# Patient Record
Sex: FEMALE | Race: BLACK OR AFRICAN AMERICAN | Employment: UNEMPLOYED | URBAN - METROPOLITAN AREA
[De-identification: names, ages, dates, MRNs, and addresses within clinical notes are randomized per-mention and may not be internally consistent; named-entity substitution may affect disease eponyms.]

---

## 2019-04-07 ENCOUNTER — ED HISTORICAL/CONVERTED ENCOUNTER (OUTPATIENT)
Dept: OTHER | Age: 34
End: 2019-04-07

## 2019-04-10 ENCOUNTER — ED HISTORICAL/CONVERTED ENCOUNTER (OUTPATIENT)
Dept: OTHER | Age: 34
End: 2019-04-10

## 2019-04-16 ENCOUNTER — ED HISTORICAL/CONVERTED ENCOUNTER (OUTPATIENT)
Dept: OTHER | Age: 34
End: 2019-04-16

## 2019-04-17 ENCOUNTER — OP HISTORICAL/CONVERTED ENCOUNTER (OUTPATIENT)
Dept: OTHER | Age: 34
End: 2019-04-17

## 2019-07-29 ENCOUNTER — OP HISTORICAL/CONVERTED ENCOUNTER (OUTPATIENT)
Dept: OTHER | Age: 34
End: 2019-07-29

## 2020-08-19 ENCOUNTER — OFFICE VISIT (OUTPATIENT)
Dept: OBGYN CLINIC | Age: 35
End: 2020-08-19
Payer: MEDICAID

## 2020-08-19 VITALS — WEIGHT: 231 LBS | BODY MASS INDEX: 39.44 KG/M2 | HEIGHT: 64 IN

## 2020-08-19 DIAGNOSIS — E66.01 SEVERE OBESITY (HCC): ICD-10-CM

## 2020-08-19 DIAGNOSIS — Z01.411 ENCOUNTER FOR GYNECOLOGICAL EXAMINATION WITH ABNORMAL FINDING: Primary | ICD-10-CM

## 2020-08-19 DIAGNOSIS — Z11.3 SCREENING FOR VENEREAL DISEASE: ICD-10-CM

## 2020-08-19 DIAGNOSIS — N97.0 OLIGO-OVULATION: ICD-10-CM

## 2020-08-19 DIAGNOSIS — Z12.4 SCREENING FOR MALIGNANT NEOPLASM OF THE CERVIX: ICD-10-CM

## 2020-08-19 PROCEDURE — S0612 ANNUAL GYNECOLOGICAL EXAMINA: HCPCS | Performed by: OBSTETRICS & GYNECOLOGY

## 2020-08-19 RX ORDER — LETROZOLE 2.5 MG/1
TABLET, FILM COATED ORAL
COMMUNITY
Start: 2020-06-10

## 2020-08-19 NOTE — PROGRESS NOTES
patient is a 27year-old black female  1 para 65, last menstrual period 2020, history of laparoscopic right salpingectomy for ruptured ectopic pregnancy, 2019, history of an abnormal Pap smear , 10 year history of oral contraceptive use complicated by headaches, and weight loss, 1 year history of patch use complicated by menorrhagia, who presents for annual gynecologic exam.    Patient has a history of a right-sided ectopic pregnancy requiring salpingectomy, underwent subsequent hysterosalpingogram that demonstrated a patent left tube, and since then has undergone ovulation induction for 2 cycles-in May and  of this year. She states that she is without gynecologic complaints. She denies vaginal discharge itching odor pelvic pain or vaginal bleeding. She is sexually active with the same partner since . Last sexual encounter was 4 days ago and she denies pain or bleeding with intercourse. She works as a .  She is a nonsmoker    Patient does not currently perform monthly self breast examination-she was encouraged to do so  Patient does not currently consume vitamin D regularly-she was encouraged to do so (she does drink a lot of OJ however)  Patient does not currently exercise regularly-she was encouraged to do so    No past medical history on file. No past surgical history on file.    Social History     Socioeconomic History    Marital status: SINGLE     Spouse name: Not on file    Number of children: Not on file    Years of education: Not on file    Highest education level: Not on file   Tobacco Use    Smoking status: Never Smoker    Smokeless tobacco: Never Used   Substance and Sexual Activity    Alcohol use: Never     Frequency: Never    Drug use: Never    Sexual activity: Yes      Family History   Problem Relation Age of Onset    No Known Problems Mother     No Known Problems Father       No current outpatient medications on file prior to visit. No current facility-administered medications on file prior to visit.       Allergies as of 08/19/2020 - Review Complete 08/18/2020   Allergen Reaction Noted    Penicillins Unknown (comments) 08/18/2020       Constitutional  General Appearance: healthy-appearing, well-nourished, well-developed (black female)  Level of Distress: NAD  Ambulation: ambulating normally    Psychiatric  Insight: good judgement  Mental Status: active and alert, normal mood, normal affect  Orientation: to time, to place, to person  Memory: recent memory normal, remote memory normal    Head  Head: normocephalic, atraumatic    Lungs  Respiratory effort: no dyspnea  Auscultation: breath sounds normal, good air movement, CTA except as noted, no wheezing, no rales/crackles, no rhonchi    Cardiovascular  Heart Auscultation: RRR, normal S1, normal S2, no murmurs, no rubs, no gallops  Pulses including femoral / pedal: normal throughout    Breasts without masses, nipple discharge, axillary adenopathy    Abdomen  Bowel Sounds: normal  Inspection and Palpation: soft, non-distended, no tenderness, no guarding, no rebound tenderness, no masses, no CVA tenderness (4 incisions are well-healed  Liver: non-tender, no hepatomegaly  Spleen: non-tender, no splenomegaly  Hernia: none palpable    Pelvic exam:  External genitalia within normal limits  Vagina: Bloody discharge  Cervix: Nulliparous, without cervical motion tenderness, mucoid discharge  Uterus normal size without tenderness, midline  Adnexa without masses, nontender  Bladder, urethra within normal limits    Musculoskeletal:  Motor Strength and Tone: normal motor strength, normal tone  Joints, Bones, and Muscles: normal movement of all extremities, no bony abnormalities, no contractures, no malalignment, no tenderness  Extremities: no cyanosis, no edema, no varicosities, no palpable cord    Neurologic  Gait and Station: normal gait, normal station  Sensation: grossly intact  Reflexes: DTRs 2+ bilaterally throughout    Skin  Inspection and palpation: Multiple tattoos; no rash, no lesions, no ulcer, no abnormal nevi, no induration, no nodules, good turgor, no jaundice  Nails: normal      ICD-10-CM ICD-9-CM    1. Encounter for gynecological examination with abnormal finding  Z01.411 V72.31    2. Severe obesity ([address]  E66.01 278.01    3. Screening for malignant neoplasm of the cervix  Z12.4 V76.2 PAP IG, CT-NG TV RFX HPV ZARW([id number])   4. Screening for venereal disease  Z11.3 V74.5    5.  Suppression of menses  N94.89 626.8      Normal exam  Check Pap smear, STD screen  Continue ovulation induction  Discussed reproductive endocrinology referral, but patient wants to attempt approximately 4-6 more cycles  Follow-up in 6 months  Encourage self breast examination, vitamin D consumption, exercise

## 2020-08-23 LAB
C TRACH RRNA CVX QL NAA+PROBE: NEGATIVE
CYTOLOGIST CVX/VAG CYTO: NORMAL
CYTOLOGY CVX/VAG DOC CYTO: NORMAL
CYTOLOGY CVX/VAG DOC THIN PREP: NORMAL
DX ICD CODE: NORMAL
LABCORP, 190119: NORMAL
Lab: NORMAL
N GONORRHOEA RRNA CVX QL NAA+PROBE: NEGATIVE
OTHER STN SPEC: NORMAL
STAT OF ADQ CVX/VAG CYTO-IMP: NORMAL
T VAGINALIS RRNA SPEC QL NAA+PROBE: NEGATIVE

## 2021-01-21 ENCOUNTER — TELEPHONE (OUTPATIENT)
Dept: OBGYN CLINIC | Age: 36
End: 2021-01-21

## 2021-01-21 NOTE — TELEPHONE ENCOUNTER
Patient states that on her last appt with Dr. Mateus Edmond they discussed diet pills and if she wanted them to call up here so he can write the script. She would like a call back.

## 2021-01-26 NOTE — TELEPHONE ENCOUNTER
Spoke with Dr Chencho Wyman and he had wanted her to follow up in 6 mos, appt scheduled, pt notified.

## 2021-02-15 ENCOUNTER — OFFICE VISIT (OUTPATIENT)
Dept: OBGYN CLINIC | Age: 36
End: 2021-02-15
Payer: MEDICAID

## 2021-02-15 VITALS
HEIGHT: 64 IN | SYSTOLIC BLOOD PRESSURE: 121 MMHG | HEART RATE: 83 BPM | WEIGHT: 238 LBS | TEMPERATURE: 98.1 F | BODY MASS INDEX: 40.63 KG/M2 | DIASTOLIC BLOOD PRESSURE: 92 MMHG

## 2021-02-15 DIAGNOSIS — Z87.59 HISTORY OF ECTOPIC PREGNANCY: ICD-10-CM

## 2021-02-15 DIAGNOSIS — E66.01 MORBID OBESITY (HCC): Primary | ICD-10-CM

## 2021-02-15 PROCEDURE — 99213 OFFICE O/P EST LOW 20 MIN: CPT | Performed by: OBSTETRICS & GYNECOLOGY

## 2021-02-15 NOTE — PROGRESS NOTES
patient is a 38 y.o. black female  1 para 0010,  last menstrual period 2021, history of laparoscopic right salpingectomy for ruptured ectopic pregnancy, 2019, history of an abnormal Pap smear , 10 year history of oral contraceptive use complicated by headaches, and weight loss, 1 year history of patch use complicated by menorrhagia, who presents for concerns regarding fertility. Patient has a history of a right-sided ectopic pregnancy requiring salpingectomy, underwent subsequent hysterosalpingogram that demonstrated a patent left tube, and since then has undergone ovulation induction letrozole since. She has several concerns, primarily of which is her weight. We discussed several strategies to weight loss including Atkins, as well as calorie restriction, and exercise. Patient voiced understanding. She also discussed her concerns about infertility, and voices some interest in intrauterine insemination. She does remark that she occasionally skips periods. She states that she is without gynecologic complaints. She denies vaginal discharge itching odor pelvic pain or vaginal bleeding. She is sexually active with the same partner since . Last sexual encounter was 10 days ago and she denies pain or bleeding with intercourse. She works as a .  She is a nonsmoker    Last Pap smear 2020-negative; STD screen was negative as well    History reviewed. No pertinent past medical history. History reviewed. No pertinent surgical history.    Social History     Socioeconomic History    Marital status: SINGLE     Spouse name: Not on file    Number of children: Not on file    Years of education: Not on file    Highest education level: Not on file   Tobacco Use    Smoking status: Never Smoker    Smokeless tobacco: Never Used   Substance and Sexual Activity    Alcohol use: Never     Frequency: Never    Drug use: Never    Sexual activity: Yes Family History   Problem Relation Age of Onset    No Known Problems Mother     No Known Problems Father       Current Outpatient Medications on File Prior to Visit   Medication Sig Dispense Refill    letrozole (FEMARA) 2.5 mg tablet TAKE 2 TABLETS BY MOUTH ONCE A DAY ON DAYS 3 7 OF MENSTRUAL CYCLE       No current facility-administered medications on file prior to visit.       Allergies as of 02/15/2021 - Review Complete 02/15/2021   Allergen Reaction Noted    Penicillins Unknown (comments) 08/18/2020       Constitutional  General Appearance: healthy-appearing, well-nourished, well-developed (black female)  Level of Distress: NAD  Ambulation: ambulating normally    Psychiatric  Insight: good judgement  Mental Status: active and alert, normal mood, normal affect  Orientation: to time, to place, to person  Memory: recent memory normal, remote memory normal    Head  Head: normocephalic, atraumatic    Lungs  Respiratory effort: no dyspnea  Auscultation: breath sounds normal, good air movement, CTA except as noted, no wheezing, no rales/crackles, no rhonchi    Cardiovascular  Heart Auscultation: RRR, normal S1, normal S2, no murmurs, no rubs, no gallops  Pulses including femoral / pedal: normal throughout    Abdomen  Bowel Sounds: normal  Inspection and Palpation: soft, non-distended, no tenderness, no guarding, no rebound tenderness, no masses, no CVA tenderness (4 incisions are well-healed  Liver: non-tender, no hepatomegaly  Spleen: non-tender, no splenomegaly  Hernia: none palpable    Musculoskeletal:  Motor Strength and Tone: normal motor strength, normal tone  Joints, Bones, and Muscles: normal movement of all extremities, no bony abnormalities, no contractures, no malalignment, no tenderness  Extremities: no cyanosis, no edema, no varicosities, no palpable cord    Neurologic  Gait and Station: normal gait, normal station  Sensation: grossly intact  Reflexes: DTRs 2+ bilaterally throughout    Skin  Inspection and palpation: Multiple tattoos; no rash, no lesions, no ulcer, no abnormal nevi, no induration, no nodules, good turgor, no jaundice  Nails: normal      ICD-10-CM ICD-9-CM    1. Morbid obesity ([address]  E66.01 278.01    2. History of ectopic pregnancy  Z87.59 V13.29      Dieting and exercise regimens discussed. Intrauterine insemination discussed, and patient given referral for reproductive endocrinology.   Needs annual exam August/September 2021

## 2022-03-19 PROBLEM — E66.01 SEVERE OBESITY (HCC): Status: ACTIVE | Noted: 2020-08-19

## 2022-07-19 ENCOUNTER — OFFICE VISIT (OUTPATIENT)
Dept: OBGYN CLINIC | Age: 37
End: 2022-07-19
Payer: MEDICAID

## 2022-07-19 VITALS
SYSTOLIC BLOOD PRESSURE: 135 MMHG | OXYGEN SATURATION: 99 % | RESPIRATION RATE: 16 BRPM | WEIGHT: 226 LBS | BODY MASS INDEX: 38.58 KG/M2 | DIASTOLIC BLOOD PRESSURE: 83 MMHG | HEIGHT: 64 IN | HEART RATE: 77 BPM

## 2022-07-19 DIAGNOSIS — Z12.4 ENCOUNTER FOR SCREENING FOR MALIGNANT NEOPLASM OF CERVIX: ICD-10-CM

## 2022-07-19 DIAGNOSIS — Z11.3 ROUTINE SCREENING FOR STI (SEXUALLY TRANSMITTED INFECTION): ICD-10-CM

## 2022-07-19 DIAGNOSIS — Z01.419 GYNECOLOGIC EXAM NORMAL: Primary | ICD-10-CM

## 2022-07-19 PROCEDURE — 99395 PREV VISIT EST AGE 18-39: CPT | Performed by: OBSTETRICS & GYNECOLOGY

## 2022-07-21 NOTE — PROGRESS NOTES
Ehsan Lam is a 39 y.o. female, , Patient's last menstrual period was 2022., who presents today for the following:  Chief Complaint   Patient presents with    Annual Exam        Allergies   Allergen Reactions    Penicillins Unknown (comments)       Current Outpatient Medications   Medication Sig    letrozole (FEMARA) 2.5 mg tablet TAKE 2 TABLETS BY MOUTH ONCE A DAY ON DAYS 3 7 OF MENSTRUAL CYCLE     No current facility-administered medications for this visit. History reviewed. No pertinent past medical history. History reviewed. No pertinent surgical history. Family History   Problem Relation Age of Onset    No Known Problems Mother     No Known Problems Father        Social History     Socioeconomic History    Marital status: SINGLE     Spouse name: Not on file    Number of children: Not on file    Years of education: Not on file    Highest education level: Not on file   Occupational History    Not on file   Tobacco Use    Smoking status: Never    Smokeless tobacco: Never   Substance and Sexual Activity    Alcohol use: Never    Drug use: Never    Sexual activity: Yes   Other Topics Concern    Not on file   Social History Narrative    Not on file     Social Determinants of Health     Financial Resource Strain: Not on file   Food Insecurity: Not on file   Transportation Needs: Not on file   Physical Activity: Not on file   Stress: Not on file   Social Connections: Not on file   Intimate Partner Violence: Not on file   Housing Stability: Not on file         Annual Exam  Annual exam  STI screen    Review of Systems   Constitutional: Negative. Respiratory: Negative. Cardiovascular: Negative. Gastrointestinal: Negative. Genitourinary: Negative. Musculoskeletal: Negative. Skin: Negative. Neurological: Negative. Endo/Heme/Allergies: Negative. Psychiatric/Behavioral: Negative. All other systems reviewed and are negative.        /83 (BP 1 Location: Right arm, BP Patient Position: Sitting)   Pulse 77   Resp 16   Ht 5' 4\" (1.626 m)   Wt 226 lb (102.5 kg)   LMP 07/17/2022   SpO2 99%   BMI 38.79 kg/m²    OBGyn Exam   Constitutional:     General Appearance: healthy-appearing, well-nourished, and well-developed; Level of Distress: NAD. Ambulation: ambulating normally. Psychiatric:   Insight: good judgement. Mental Status: normal mood and affect and active and alert. Orientation: to time, place, and person. Memory: recent memory normal and remote memory normal.     Head: Head: normocephalic and atraumatic. Neck:   Neck: supple, FROM, trachea midline, and no masses. Lymph Nodes: no cervical LAD, supraclavicular LAD, axillary LAD, or inguinal LAD. Thyroid: no enlargement or nodules and non-tender. Lungs:   Respiratory effort: no dyspnea. Cardiovascular: .   Pulses including femoral / pedal: normal throughout. Breast: Breast: no masses or abnormal secretions and normal appearance. Abdomen:    no tenderness, guarding, masses, rebound tenderness, or CVA tenderness and non-distended. Female :   External genitalia: no lesions or rash and normal.   Vagina: moist mucosa;   Cervix: no discharge, inflammation, or cervical motion tenderness   Uterus: midline, smooth, and non-tender; normal size   Adnexae: no adnexal mass or tenderness and size WNL. Bladder and Urethra: normal bladder and urethra (except where noted). 1. Gynecologic exam normal    - PAP IG, CT-NG-TV, RFX APTIMA HPV ASCUS ([id number])    2. Encounter for screening for malignant neoplasm of cervix      3.  Routine screening for STI (sexually transmitted infection)  - PAP IG, CT-NG-TV, RFX APTIMA HPV ASCUS ([id number])

## 2022-07-21 NOTE — PATIENT INSTRUCTIONS
Breast Self-Exam: Care Instructions  Your Care Instructions     A breast self-exam is when you check your breasts for lumps or changes. This regular exam helps you learn how your breasts normally look and feel. Most breast problems or changes are not because of cancer. Breast self-exam is not a substitute for a mammogram. Having regular breast exams by your doctor and regular mammograms improve your chances of finding any problems with your breasts. Some women set a time each month to do a step-by-step breast self-exam. Other women like a less formal system. They might look at their breasts as they brush their teeth, or feel their breasts once in a while in the shower. If you notice a change in your breast, tell your doctor. Follow-up care is a key part of your treatment and safety. Be sure to make and go to all appointments, and call your doctor if you are having problems. It's also a good idea to know your test results and keep a list of the medicines you take. How do you do a breast self-exam?  The best time to examine your breasts is usually one week after your menstrual period begins. Your breasts should not be tender then. If you do not have periods, you might do your exam on a day of the month that is easy to remember. To examine your breasts:  Remove all your clothes above the waist and lie down. When you are lying down, your breast tissue spreads evenly over your chest wall, which makes it easier to feel all your breast tissue. Use the pads--not the fingertips--of the 3 middle fingers of your left hand to check your right breast. Move your fingers slowly in small coin-sized circles that overlap. Use three levels of pressure to feel of all your breast tissue. Use light pressure to feel the tissue close to the skin surface. Use medium pressure to feel a little deeper. Use firm pressure to feel your tissue close to your breastbone and ribs.  Use each pressure level to feel your breast tissue before moving on to the next spot. Check your entire breast, moving up and down as if following a strip from the collarbone to the bra line, and from the armpit to the ribs. Repeat until you have covered the entire breast.  Repeat this procedure for your left breast, using the pads of the 3 middle fingers of your right hand. To examine your breasts while in the shower:  Place one arm over your head and lightly soap your breast on that side. Using the pads of your fingers, gently move your hand over your breast (in the strip pattern described above), feeling carefully for any lumps or changes. Repeat for the other breast.  Have your doctor inspect anything you notice to see if you need further testing. Where can you learn more? Go to http://www.gray.com/  Enter P148 in the search box to learn more about \"Breast Self-Exam: Care Instructions. \"  Current as of: September 8, 2021               Content Version: 13.2  © 2006-2022 [employer]. Care instructions adapted under license by [employer] (which disclaims liability or warranty for this information). If you have questions about a medical condition or this instruction, always ask your healthcare professional. [place] [address] any warranty or liability for your use of this information.

## 2022-07-27 LAB
C TRACH RRNA CVX QL NAA+PROBE: NEGATIVE
CYTOLOGIST CVX/VAG CYTO: ABNORMAL
CYTOLOGY CVX/VAG DOC CYTO: ABNORMAL
CYTOLOGY CVX/VAG DOC THIN PREP: ABNORMAL
DX ICD CODE: ABNORMAL
DX ICD CODE: ABNORMAL
LABCORP, 190119: ABNORMAL
Lab: ABNORMAL
N GONORRHOEA RRNA CVX QL NAA+PROBE: NEGATIVE
OTHER STN SPEC: ABNORMAL
PATHOLOGIST CVX/VAG CYTO: ABNORMAL
STAT OF ADQ CVX/VAG CYTO-IMP: ABNORMAL
T VAGINALIS RRNA SPEC QL NAA+PROBE: NEGATIVE

## 2022-08-11 ENCOUNTER — OFFICE VISIT (OUTPATIENT)
Dept: OBGYN CLINIC | Age: 37
End: 2022-08-11
Payer: MEDICAID

## 2022-08-11 VITALS
SYSTOLIC BLOOD PRESSURE: 131 MMHG | HEIGHT: 64 IN | WEIGHT: 227 LBS | BODY MASS INDEX: 38.76 KG/M2 | DIASTOLIC BLOOD PRESSURE: 85 MMHG

## 2022-08-11 DIAGNOSIS — N92.6 IRREGULAR MENSES: ICD-10-CM

## 2022-08-11 DIAGNOSIS — R87.619 ATYPICAL GLANDULAR CELLS ON CERVICAL PAP SMEAR: Primary | ICD-10-CM

## 2022-08-11 PROCEDURE — 57456 ENDOCERV CURETTAGE W/SCOPE: CPT | Performed by: OBSTETRICS & GYNECOLOGY

## 2022-08-11 PROCEDURE — 58110 BX DONE W/COLPOSCOPY ADD-ON: CPT | Performed by: OBSTETRICS & GYNECOLOGY

## 2022-08-14 NOTE — PROGRESS NOTES
Hira Bojorquez is a 39 y.o. female, , Patient's last menstrual period was 2022., who presents today for the following:  Colposcopy      Allergies   Allergen Reactions    Penicillins Unknown (comments)       Current Outpatient Medications   Medication Sig    letrozole (FEMARA) 2.5 mg tablet TAKE 2 TABLETS BY MOUTH ONCE A DAY ON DAYS 3 7 OF MENSTRUAL CYCLE     No current facility-administered medications for this visit. History reviewed. No pertinent past medical history. History reviewed. No pertinent surgical history. Family History   Problem Relation Age of Onset    No Known Problems Mother     No Known Problems Father        Social History     Socioeconomic History    Marital status: SINGLE     Spouse name: Not on file    Number of children: Not on file    Years of education: Not on file    Highest education level: Not on file   Occupational History    Not on file   Tobacco Use    Smoking status: Never    Smokeless tobacco: Never   Substance and Sexual Activity    Alcohol use: Never    Drug use: Never    Sexual activity: Yes   Other Topics Concern    Not on file   Social History Narrative    Not on file     Social Determinants of Health     Financial Resource Strain: Not on file   Food Insecurity: Not on file   Transportation Needs: Not on file   Physical Activity: Not on file   Stress: Not on file   Social Connections: Not on file   Intimate Partner Violence: Not on file   Housing Stability: Not on file         Colposcopy  Patient presents for colposcopy  Atypical glandular cells on pap  Consent obtained    Review of Systems   Constitutional: Negative. Respiratory: Negative. Cardiovascular: Negative. Gastrointestinal: Negative. Genitourinary: Negative. Musculoskeletal: Negative. Skin: Negative. Neurological: Negative. Endo/Heme/Allergies: Negative. Psychiatric/Behavioral: Negative. All other systems reviewed and are negative.        /85 (BP 1 Location: Left upper arm, BP Patient Position: Sitting)   Ht 5' 4\" (1.626 m)   Wt 227 lb (103 kg)   LMP 07/17/2022   BMI 38.96 kg/m²    OBGyn Exam     PE:  Constitutional: General Appearance: healthy-appearing, well-nourished, well-developed, and well groomed. Psychiatric: Orientation: to time, place, and person. Mood and Affect: normal mood and affect and appropriate and active and alert. Abdomen: Auscultation/Inspection/Palpation: no tenderness and mass and non-distended. Hernia: none palpated. Female Genitalia: Vulva: no masses, atrophy, or lesions. Bladder/Urethra: no urethral discharge or mass and normal meatus and bladder non distended. Vagina no tenderness, erythema, cystocele, rectocele, abnormal vaginal discharge, or vesicle(s) or ulcers. Cervix: no discharge or cervical motion tenderness and grossly normal.     Uterus: mobile, non-tender, and no uterine prolapse. Colposcopy Procedure Note    Indications:   Most recent Pap smear 2 weeks ago result: Glandular cell abnormality (YAAKOV). Endocervical currettage (ECC) is recommended. Procedure Details   The risks and benefits of the procedure and verbal informed consent obtained. Speculum placed in vagina and excellent visualization of cervix achieved, cervix swabbed x 3 with acetic acid solution. Findings:  Cervix: no visible lesions, no mosaicism, no punctation, and no abnormal vasculature; endocervical speculum placed, SCJ visualized 360 degrees without lesions, no biopsies taken, endocervical curettage performed, endometrial biopsy performed, specimen labelled and sent to pathology, and hemostasis achieved with Monsel's solution. Vaginal inspection: normal without visible lesions. Vulvar colposcopy: vulvar colposcopy not performed. Specimens: ecc, EMB- endometrium    Complications: vasovagal episode. Patient recovered quickly    Plan:  Specimens labelled and sent to Pathology.   Will base further treatment on Pathology findings. Treatment options discussed with patient. Post biopsy instructions given to patient. Return to discuss Pathology results in 2 weeks. .    Endometrial Biopsy:     Risks, benefits and indications for endometrial biopsy procedure fully reviewed. Patient questions answered. Informed consent obtained. Speculum placed into vagina. Cervix was prepped with Betadine x 3. Tenaculum applied to anterior lip of cervix. Endometrial pipelle introduced. Suction initiated. A total of 4 passes performed. Endometrial tissue obtained. Tenaculum site hemostatic after application of Monsel's solution. Patient tolerated procedure well. 1. Atypical glandular cells on cervical Pap smear    - COLPOSCOPY,CERVIX W/ADJ VAGINA, CURETTAG  - BIOPSY OF UTERUS LINING  - SURGICAL PATHOLOGY  - SURGICAL PATHOLOGY    2.  Irregular menses    - BETA HCG, QT; Future

## 2022-08-15 ENCOUNTER — TELEPHONE (OUTPATIENT)
Dept: OBGYN CLINIC | Age: 37
End: 2022-08-15

## 2022-08-15 LAB
CPT DISCLAIMER: ABNORMAL
CPT DISCLAIMER: NORMAL
DIAGNOSIS SYNOPSIS:: ABNORMAL
DIAGNOSIS SYNOPSIS:: NORMAL
DX ICD CODE: ABNORMAL
DX ICD CODE: ABNORMAL
DX ICD CODE: NORMAL
DX ICD CODE: NORMAL
PATH REPORT.FINAL DX SPEC: ABNORMAL
PATH REPORT.FINAL DX SPEC: NORMAL
PATH REPORT.GROSS SPEC: ABNORMAL
PATH REPORT.GROSS SPEC: NORMAL
PATH REPORT.RELEVANT HX SPEC: ABNORMAL
PATH REPORT.RELEVANT HX SPEC: NORMAL
PATH REPORT.SITE OF ORIGIN SPEC: ABNORMAL
PATH REPORT.SITE OF ORIGIN SPEC: NORMAL
PATHOLOGIST NAME: ABNORMAL
PATHOLOGIST NAME: NORMAL
PAYMENT PROCEDURE: ABNORMAL
PAYMENT PROCEDURE: NORMAL

## 2023-05-21 RX ORDER — LETROZOLE 2.5 MG/1
TABLET, FILM COATED ORAL
COMMUNITY
Start: 2020-06-10